# Patient Record
Sex: MALE | Race: WHITE | NOT HISPANIC OR LATINO | Employment: UNEMPLOYED | URBAN - METROPOLITAN AREA
[De-identification: names, ages, dates, MRNs, and addresses within clinical notes are randomized per-mention and may not be internally consistent; named-entity substitution may affect disease eponyms.]

---

## 2017-02-04 ENCOUNTER — APPOINTMENT (OUTPATIENT)
Dept: GENERAL RADIOLOGY | Facility: HOSPITAL | Age: 37
End: 2017-02-04

## 2017-02-04 ENCOUNTER — HOSPITAL ENCOUNTER (EMERGENCY)
Facility: HOSPITAL | Age: 37
Discharge: HOME | End: 2017-02-04
Attending: EMERGENCY MEDICINE | Admitting: EMERGENCY MEDICINE

## 2017-02-04 VITALS
RESPIRATION RATE: 20 BRPM | HEIGHT: 71 IN | OXYGEN SATURATION: 98 % | HEART RATE: 111 BPM | DIASTOLIC BLOOD PRESSURE: 80 MMHG | WEIGHT: 175 LBS | TEMPERATURE: 97.8 F | BODY MASS INDEX: 24.5 KG/M2 | SYSTOLIC BLOOD PRESSURE: 136 MMHG

## 2017-02-04 DIAGNOSIS — S80.11XA CONTUSION OF RIGHT LOWER LEG, INITIAL ENCOUNTER: Primary | ICD-10-CM

## 2017-02-04 DIAGNOSIS — T14.8XXA ABRASION: ICD-10-CM

## 2017-02-04 PROCEDURE — 99283 EMERGENCY DEPT VISIT LOW MDM: CPT

## 2017-02-04 PROCEDURE — 90471 IMMUNIZATION ADMIN: CPT | Performed by: EMERGENCY MEDICINE

## 2017-02-04 PROCEDURE — 90715 TDAP VACCINE 7 YRS/> IM: CPT | Performed by: EMERGENCY MEDICINE

## 2017-02-04 PROCEDURE — 73590 X-RAY EXAM OF LOWER LEG: CPT

## 2017-02-04 PROCEDURE — 25010000002 TDAP 5-2.5-18.5 LF-MCG/0.5 SUSPENSION: Performed by: EMERGENCY MEDICINE

## 2017-02-04 RX ORDER — HYDROCODONE BITARTRATE AND ACETAMINOPHEN 10; 325 MG/1; MG/1
1 TABLET ORAL ONCE
Status: COMPLETED | OUTPATIENT
Start: 2017-02-04 | End: 2017-02-04

## 2017-02-04 RX ORDER — NAPROXEN 500 MG/1
500 TABLET ORAL 2 TIMES DAILY PRN
Qty: 12 TABLET | Refills: 0 | Status: SHIPPED | OUTPATIENT
Start: 2017-02-04

## 2017-02-04 RX ADMIN — TETANUS TOXOID, REDUCED DIPHTHERIA TOXOID AND ACELLULAR PERTUSSIS VACCINE, ADSORBED 0.5 ML: 5; 2.5; 8; 8; 2.5 SUSPENSION INTRAMUSCULAR at 04:06

## 2017-02-04 RX ADMIN — HYDROCODONE BITARTRATE AND ACETAMINOPHEN 1 TABLET: 10; 325 TABLET ORAL at 03:14

## 2017-02-04 NOTE — ED PROVIDER NOTES
Subjective   HPI Comments: Rufus Frausto is a 36 y.o. Male who presents to the ED with a right lower leg injury following a fall. This evening while walking through his home he tripped and hit his shin on a barbell or table. He noticed immediate pain and swelling. He presents to the ED for further evaluation to rule out broken bone. No head blow or LOC. No other injuries reported at this time.          Patient is a 36 y.o. male presenting with lower extremity pain.   History provided by:  Patient and relative  Lower Extremity Issue   Location:  Leg  Injury: yes    Mechanism of injury: fall    Fall:     Fall occurred:  Tripped    Impact surface:  Furniture    Point of impact: shin.    Entrapped after fall: no    Leg location:  R lower leg  Pain details:     Radiates to:  Does not radiate    Severity:  Moderate    Onset quality:  Sudden    Timing:  Constant    Progression:  Unchanged  Chronicity:  New  Dislocation: no    Foreign body present:  No foreign bodies  Prior injury to area:  No  Relieved by:  None tried  Worsened by:  Bearing weight  Ineffective treatments:  None tried  Associated symptoms: swelling    Associated symptoms: no back pain and no neck pain        Review of Systems   Gastrointestinal: Negative for nausea.   Musculoskeletal: Negative for back pain and neck pain.        Lower leg pain   Neurological: Negative for headaches.   All other systems reviewed and are negative.      Past Medical History   Diagnosis Date   • Asthma    • Injury of back        No Known Allergies    History reviewed. No pertinent past surgical history.    History reviewed. No pertinent family history.    Social History     Social History   • Marital status:      Spouse name: N/A   • Number of children: N/A   • Years of education: N/A     Social History Main Topics   • Smoking status: Former Smoker     Quit date: 1/19/2016   • Smokeless tobacco: None   • Alcohol use No   • Drug use: No   • Sexual activity: Defer  "    Other Topics Concern   • None     Social History Narrative         Objective   Physical Exam   Constitutional: He is oriented to person, place, and time. He appears well-developed and well-nourished. No distress.   HENT:   Head: Normocephalic and atraumatic.   Eyes: Conjunctivae are normal. Pupils are equal, round, and reactive to light.   Neck: Normal range of motion. Neck supple.   Cardiovascular: Normal rate, regular rhythm and normal heart sounds.    Pulmonary/Chest: Effort normal and breath sounds normal. No respiratory distress.   Abdominal: Soft. Bowel sounds are normal.   Musculoskeletal: Normal range of motion. He exhibits tenderness.   Moderate hematoma right shin.   No tenderness in ankle or knee.  Only tender to direct palpation of hematoma.    Neurological: He is alert and oriented to person, place, and time.   Skin: Skin is warm and dry.   Psychiatric: He has a normal mood and affect. His behavior is normal.   Nursing note and vitals reviewed.      Procedures         ED Course  ED Course       No results found for this or any previous visit (from the past 24 hour(s)).  Note: In addition to lab results from this visit, the labs listed above may include labs taken at another facility or during a different encounter within the last 24 hours. Please correlate lab times with ED admission and discharge times for further clarification of the services performed during this visit.    XR Tibia Fibula 2 View Right    (Results Pending)     Vitals:    02/04/17 0239 02/04/17 0413   BP: 160/85 136/80   BP Location: Left arm    Patient Position: Sitting Sitting   Pulse: 111    Resp: 20    Temp: 97.8 °F (36.6 °C)    TempSrc: Oral    SpO2: 98% 98%   Weight: 175 lb (79.4 kg)    Height: 71\" (180.3 cm)      Medications   HYDROcodone-acetaminophen (NORCO)  MG per tablet 1 tablet (1 tablet Oral Given 2/4/17 9345)   Tdap (BOOSTRIX) injection 0.5 mL (0.5 mL Intramuscular Given 2/4/17 1466)     ECG/EMG Results (last " 24 hours)     ** No results found for the last 24 hours. **                      [facility]    Final diagnoses:   Contusion of right lower leg, initial encounter   Abrasion       Documentation assistance provided by blayne Be.  Information recorded by the scribe was done at my direction and has been verified and validated by me.     Ania Be  02/04/17 0351       Varun Pavon DO  02/04/17 0707